# Patient Record
Sex: MALE | Race: WHITE | NOT HISPANIC OR LATINO | Employment: OTHER | URBAN - NONMETROPOLITAN AREA
[De-identification: names, ages, dates, MRNs, and addresses within clinical notes are randomized per-mention and may not be internally consistent; named-entity substitution may affect disease eponyms.]

---

## 2024-04-17 ENCOUNTER — TELEPHONE (OUTPATIENT)
Dept: CARDIOLOGY | Facility: CLINIC | Age: 64
End: 2024-04-17
Payer: MEDICARE

## 2024-04-17 PROBLEM — I25.10 ASCVD (ARTERIOSCLEROTIC CARDIOVASCULAR DISEASE): Status: ACTIVE | Noted: 2024-04-17

## 2024-04-17 PROBLEM — F17.200 CURRENT SMOKER: Status: ACTIVE | Noted: 2024-04-17

## 2024-04-17 PROBLEM — I25.2 HISTORY OF ACUTE MYOCARDIAL INFARCTION OF INFERIOR WALL: Status: ACTIVE | Noted: 2024-04-17

## 2024-04-17 PROBLEM — I10 ESSENTIAL HYPERTENSION: Status: ACTIVE | Noted: 2024-04-17

## 2024-04-17 PROBLEM — E78.5 HYPERLIPIDEMIA: Status: ACTIVE | Noted: 2024-04-17

## 2024-04-17 RX ORDER — LISINOPRIL 20 MG/1
1 TABLET ORAL DAILY
COMMUNITY
Start: 2022-06-29

## 2024-04-17 RX ORDER — CELECOXIB 200 MG/1
200 CAPSULE ORAL 2 TIMES DAILY
COMMUNITY
Start: 2022-06-06

## 2024-04-17 RX ORDER — METOPROLOL TARTRATE 25 MG/1
25 TABLET, FILM COATED ORAL 2 TIMES DAILY
COMMUNITY
Start: 2022-06-29

## 2024-04-17 RX ORDER — ASPIRIN 81 MG/1
1 TABLET ORAL DAILY
COMMUNITY

## 2024-04-17 RX ORDER — ATORVASTATIN CALCIUM 40 MG/1
1 TABLET, FILM COATED ORAL DAILY
COMMUNITY
Start: 2022-06-29

## 2024-04-17 RX ORDER — SILDENAFIL 50 MG/1
50 TABLET, FILM COATED ORAL AS NEEDED
COMMUNITY
Start: 2022-02-11

## 2024-04-17 RX ORDER — NITROGLYCERIN 0.4 MG/1
0.4 TABLET SUBLINGUAL EVERY 5 MIN PRN
COMMUNITY
Start: 2022-01-25

## 2024-04-17 NOTE — TELEPHONE ENCOUNTER
Patient phoned into office to set up OV and discuss medications. States that he has been off his medications for about 1 year. Last seen in office 7/2022. Advised that he is scheduled to be seen in May and will have to discuss information and meds then.    Patient verbalized understanding.      To Dr. Tristian Rodriguez, DO

## 2024-05-17 ENCOUNTER — APPOINTMENT (OUTPATIENT)
Dept: CARDIOLOGY | Facility: CLINIC | Age: 64
End: 2024-05-17
Payer: MEDICARE

## 2024-05-21 ENCOUNTER — OFFICE VISIT (OUTPATIENT)
Dept: CARDIOLOGY | Facility: CLINIC | Age: 64
End: 2024-05-21
Payer: MEDICARE

## 2024-05-21 VITALS
DIASTOLIC BLOOD PRESSURE: 82 MMHG | HEIGHT: 67 IN | HEART RATE: 72 BPM | SYSTOLIC BLOOD PRESSURE: 130 MMHG | BODY MASS INDEX: 29.03 KG/M2 | WEIGHT: 185 LBS

## 2024-05-21 DIAGNOSIS — F17.200 CURRENT SMOKER: ICD-10-CM

## 2024-05-21 DIAGNOSIS — E78.2 MIXED HYPERLIPIDEMIA: ICD-10-CM

## 2024-05-21 DIAGNOSIS — I10 ESSENTIAL HYPERTENSION: ICD-10-CM

## 2024-05-21 DIAGNOSIS — I25.10 ASCVD (ARTERIOSCLEROTIC CARDIOVASCULAR DISEASE): Primary | ICD-10-CM

## 2024-05-21 PROCEDURE — 3008F BODY MASS INDEX DOCD: CPT | Performed by: NURSE PRACTITIONER

## 2024-05-21 PROCEDURE — 4004F PT TOBACCO SCREEN RCVD TLK: CPT | Performed by: NURSE PRACTITIONER

## 2024-05-21 PROCEDURE — 99213 OFFICE O/P EST LOW 20 MIN: CPT | Performed by: NURSE PRACTITIONER

## 2024-05-21 PROCEDURE — 3079F DIAST BP 80-89 MM HG: CPT | Performed by: NURSE PRACTITIONER

## 2024-05-21 PROCEDURE — 3075F SYST BP GE 130 - 139MM HG: CPT | Performed by: NURSE PRACTITIONER

## 2024-05-21 ASSESSMENT — ENCOUNTER SYMPTOMS
IRREGULAR HEARTBEAT: 0
ORTHOPNEA: 0
DYSPNEA ON EXERTION: 0
SYNCOPE: 0
PALPITATIONS: 0
PND: 0
NEAR-SYNCOPE: 0

## 2024-05-21 NOTE — PATIENT INSTRUCTIONS
Please bring all medicines, vitamins, and herbal supplements with you when you come to the office.    Prescriptions will not be filled unless you are compliant with your follow up appointments or have a follow up appointment scheduled as per instruction of your physician. Refills should be requested at the time of your visit.     PLAN:   Through informed decision making process incorporating patients unique circumstances, the following treatment plan will be initiated:    1.  Prescription drug management of cardiovascular medication for efficacy, adherence to treatment, side effect assessment and polypharmacy. Current treatment clinically warranted and to continue without modifications.    2. Return for follow-up; in the interim, contact the office if new symptoms arise.  Dr. Rodriguez annual    Discussed the dynamic nature of coronary artery disease and the importance of seeking medical attention if new symptoms arise.    You need to stop smoking.  Though it is not easy, more than half of all adults smokers have quit.  We encourage you to write down all the reasons you should quit smoking and set a quit date for yourself.  Ask us how we can help.  You may also call 3-800-QUIT-NOW for free resources and assistance.

## 2024-05-21 NOTE — PROGRESS NOTES
"Chief Complaint  \" I started back on my meds approximately 1 week ago\"    Reason for Visit  Overdue for annual follow-up  Patient presents to the office today for outpatient follow-up for coronary artery disease and secondary prevention.  Last evaluated in clinic by Dr. Rodriguez July 2022.  Patient reports he had been out of medications for approximately 6 months, was seen by PCP approximately 1 week ago and resume treatment as listed.    Presents today ambulatory with steady gait.  Accompanied by patient  Patient denies any hospitalizations or significant changes to interval medical history since last office follow-up.     History of Present Illness   Patient is a pleasant 63-year-old who presents to the office today\" needing to get back on track\".  He had been off of medications for approximately 6 months.  He remains aerobically active caring for his grandchildren, doing yard work and going fishing.  He has 15 stairs at home that he goes up and on a regular basis.  His prior angina was fatigability and chest pain, denies any recurrence.  No nitro use.    Patient reports that overall has no complaint(s) of chest pain, chest pressure/discomfort, claudication, dyspnea, exertional chest pressure/discomfort, fatigue, irregular heart beat, and lower extremity edema    Daily activity:   ADLs, yard work, fishing.  Denies any change in exercise capacity or functional tolerance since last office visit.    The importance of secondary prevention reviewed:  HTN: Optimal in treatment  HLD: Resume treatment  DM: Denies  Smoker: Has significantly reduced  BMI:  Reviewed the merits of healthy lifestyle choices on overall cardiovascular health.    Reports completing community screening with carotids and JULIENNE -unremarkable results.    Overall patient is pleased with current state of cardiovascular health.  At this time there are no indications for additional cardiovascular testing or need for medication changes.     Review of Systems " "  Cardiovascular:  Negative for chest pain, dyspnea on exertion, irregular heartbeat, leg swelling, near-syncope, orthopnea, palpitations, paroxysmal nocturnal dyspnea and syncope.        Visit Vitals  /82 (BP Location: Right arm, Patient Position: Sitting)   Pulse 72   Ht 1.702 m (5' 7\")   Wt 83.9 kg (185 lb)   BMI 28.98 kg/m²   Smoking Status Every Day   BSA 1.99 m²     Physical Exam  Vitals and nursing note reviewed.   Constitutional:       Appearance: Normal appearance.   Cardiovascular:      Rate and Rhythm: Normal rate and regular rhythm.      Heart sounds: Normal heart sounds.   Pulmonary:      Effort: Pulmonary effort is normal.      Breath sounds: Normal breath sounds.   Musculoskeletal:      Cervical back: Full passive range of motion without pain.      Right lower leg: No edema.      Left lower leg: No edema.   Skin:     General: Skin is cool.   Neurological:      Mental Status: He is alert and oriented to person, place, and time.   Psychiatric:         Attention and Perception: Attention normal.         Mood and Affect: Mood normal.         Behavior: Behavior is cooperative.     No Known Allergies    Current Outpatient Medications   Medication Instructions    aspirin 81 mg EC tablet 1 tablet, oral, Daily    atorvastatin (Lipitor) 40 mg tablet 1 tablet, oral, Daily    celecoxib (CELEBREX) 200 mg, oral, 2 times daily    lisinopril 20 mg tablet 1 tablet, oral, Daily    metoprolol tartrate (LOPRESSOR) 25 mg, oral, 2 times daily    nitroglycerin (NITROSTAT) 0.4 mg, sublingual, Every 5 min PRN    sildenafil (VIAGRA) 50 mg, oral, As needed      Assessment:    Current smoker  2 cigars a week    Continued every day tobacco use. Have reviewed the negative cardiovascular impact of nicotine. Continues to decline pharmacological assistance.      ASCVD (arteriosclerotic cardiovascular disease)  2019 CABG  LIMA-LAD  SVG-marginal 1  SVG-marginal 2    2021 cardiac cath  Patent grafts x 3  Diffuse small vessel " disease  LVEF 65%    Daily activity greater than 4 METS without concerning symptoms    Essential hypertension  Optimal in office    Hyperlipidemia  Recently resumed statin  Reports being due for labs next month    BMI 28.0-28.9,adult  Reviewed the merits of healthy lifestyle choices on overall cardiovascular health.      Plan:   Current treatment plan is effective, no change in therapy.  Reviewed diet, exercise and weight control.  Recommended sodium restriction.  Use of aspirin to prevent MI and TIA's discussed.  Use and side effects of nitroglycerine reviewed, call 911 if chest pain unrelieved by 3 tablets.    Through informed decision making process incorporating patients unique circumstances, the following treatment plan will be initiated:    1.  Prescription drug management of cardiovascular medication for efficacy, adherence to treatment, side effect assessment and polypharmacy. Current treatment clinically warranted and to continue without modifications.    2. Return for follow-up; in the interim, contact the office if new symptoms arise.  Dr. Rodriguez annual    Discussed the dynamic nature of coronary artery disease and the importance of seeking medical attention if new symptoms arise.    You need to stop smoking.  Though it is not easy, more than half of all adults smokers have quit.  We encourage you to write down all the reasons you should quit smoking and set a quit date for yourself.  Ask us how we can help.  You may also call 2-724-QUIT-NOW for free resources and assistance.      Lucia Pruett  MSN, APRN-CNP, PMHNP-BC  [facility]    Please excuse any errors in grammar or translation related to this dictation. Voice recognition software was utilized to prepare this document.

## 2025-05-21 ENCOUNTER — APPOINTMENT (OUTPATIENT)
Dept: CARDIOLOGY | Facility: CLINIC | Age: 65
End: 2025-05-21
Payer: MEDICARE

## 2025-05-21 VITALS
WEIGHT: 178 LBS | HEART RATE: 72 BPM | DIASTOLIC BLOOD PRESSURE: 84 MMHG | HEIGHT: 67 IN | SYSTOLIC BLOOD PRESSURE: 134 MMHG | BODY MASS INDEX: 27.94 KG/M2

## 2025-05-21 DIAGNOSIS — Z98.61 HISTORY OF PTCA: ICD-10-CM

## 2025-05-21 DIAGNOSIS — I25.2 HISTORY OF ACUTE MYOCARDIAL INFARCTION OF INFERIOR WALL: ICD-10-CM

## 2025-05-21 DIAGNOSIS — I10 ESSENTIAL HYPERTENSION: ICD-10-CM

## 2025-05-21 DIAGNOSIS — F17.200 CURRENT SMOKER: ICD-10-CM

## 2025-05-21 DIAGNOSIS — E78.2 MIXED HYPERLIPIDEMIA: ICD-10-CM

## 2025-05-21 DIAGNOSIS — I25.10 ASCVD (ARTERIOSCLEROTIC CARDIOVASCULAR DISEASE): ICD-10-CM

## 2025-05-21 RX ORDER — AMLODIPINE BESYLATE 5 MG/1
5 TABLET ORAL DAILY
COMMUNITY

## 2025-05-21 RX ORDER — NITROGLYCERIN 0.4 MG/1
0.4 TABLET SUBLINGUAL EVERY 5 MIN PRN
Qty: 100 TABLET | Refills: 1 | Status: SHIPPED | OUTPATIENT
Start: 2025-05-21 | End: 2026-05-21

## 2025-05-21 RX ORDER — LATANOPROST 50 UG/ML
1 SOLUTION/ DROPS OPHTHALMIC NIGHTLY
COMMUNITY

## 2025-05-21 RX ORDER — DORZOLAMIDE HYDROCHLORIDE AND TIMOLOL MALEATE 20; 5 MG/ML; MG/ML
1 SOLUTION/ DROPS OPHTHALMIC 2 TIMES DAILY
COMMUNITY

## 2025-05-21 RX ORDER — SOLIFENACIN SUCCINATE 5 MG/1
5 TABLET, FILM COATED ORAL DAILY
COMMUNITY

## 2025-05-21 RX ORDER — TAMSULOSIN HYDROCHLORIDE 0.4 MG/1
0.4 CAPSULE ORAL 2 TIMES DAILY
COMMUNITY

## 2025-05-21 RX ORDER — ATORVASTATIN CALCIUM 20 MG/1
20 TABLET, FILM COATED ORAL DAILY
COMMUNITY

## 2025-05-21 RX ORDER — BRIMONIDINE TARTRATE 2 MG/ML
1 SOLUTION/ DROPS OPHTHALMIC 2 TIMES DAILY
COMMUNITY

## 2025-05-21 ASSESSMENT — ENCOUNTER SYMPTOMS: DYSPNEA ON EXERTION: 1

## 2026-05-20 ENCOUNTER — APPOINTMENT (OUTPATIENT)
Dept: CARDIOLOGY | Facility: CLINIC | Age: 66
End: 2026-05-20
Payer: MEDICARE